# Patient Record
Sex: MALE | Race: BLACK OR AFRICAN AMERICAN | NOT HISPANIC OR LATINO | Employment: FULL TIME | URBAN - METROPOLITAN AREA
[De-identification: names, ages, dates, MRNs, and addresses within clinical notes are randomized per-mention and may not be internally consistent; named-entity substitution may affect disease eponyms.]

---

## 2024-03-04 ENCOUNTER — HOSPITAL ENCOUNTER (EMERGENCY)
Facility: HOSPITAL | Age: 44
Discharge: HOME | End: 2024-03-04
Payer: COMMERCIAL

## 2024-03-04 VITALS
BODY MASS INDEX: 30.78 KG/M2 | HEIGHT: 70 IN | DIASTOLIC BLOOD PRESSURE: 98 MMHG | HEART RATE: 84 BPM | TEMPERATURE: 98.1 F | SYSTOLIC BLOOD PRESSURE: 172 MMHG | OXYGEN SATURATION: 100 % | WEIGHT: 215 LBS | RESPIRATION RATE: 18 BRPM

## 2024-03-04 DIAGNOSIS — R36.9 PENILE DISCHARGE: ICD-10-CM

## 2024-03-04 DIAGNOSIS — Z11.3 ENCOUNTER FOR SCREENING EXAMINATION FOR SEXUALLY TRANSMITTED DISEASE: Primary | ICD-10-CM

## 2024-03-04 DIAGNOSIS — R03.0 ELEVATED BLOOD PRESSURE READING: ICD-10-CM

## 2024-03-04 PROCEDURE — 2500000004 HC RX 250 GENERAL PHARMACY W/ HCPCS (ALT 636 FOR OP/ED)

## 2024-03-04 PROCEDURE — 87661 TRICHOMONAS VAGINALIS AMPLIF: CPT | Mod: 59 | Performed by: NURSE PRACTITIONER

## 2024-03-04 PROCEDURE — 2500000001 HC RX 250 WO HCPCS SELF ADMINISTERED DRUGS (ALT 637 FOR MEDICARE OP)

## 2024-03-04 PROCEDURE — 99283 EMERGENCY DEPT VISIT LOW MDM: CPT

## 2024-03-04 PROCEDURE — 96372 THER/PROPH/DIAG INJ SC/IM: CPT

## 2024-03-04 PROCEDURE — 99284 EMERGENCY DEPT VISIT MOD MDM: CPT | Performed by: NURSE PRACTITIONER

## 2024-03-04 PROCEDURE — 87800 DETECT AGNT MULT DNA DIREC: CPT | Performed by: NURSE PRACTITIONER

## 2024-03-04 RX ORDER — CEFTRIAXONE 500 MG/1
500 INJECTION, POWDER, FOR SOLUTION INTRAMUSCULAR; INTRAVENOUS ONCE
Status: COMPLETED | OUTPATIENT
Start: 2024-03-04 | End: 2024-03-04

## 2024-03-04 RX ORDER — DOXYCYCLINE 100 MG/1
100 TABLET ORAL 2 TIMES DAILY
Qty: 14 TABLET | Refills: 0 | Status: SHIPPED | OUTPATIENT
Start: 2024-03-04 | End: 2024-03-11

## 2024-03-04 RX ORDER — CEFTRIAXONE 500 MG/1
INJECTION, POWDER, FOR SOLUTION INTRAMUSCULAR; INTRAVENOUS
Status: COMPLETED
Start: 2024-03-04 | End: 2024-03-04

## 2024-03-04 RX ORDER — METRONIDAZOLE 500 MG/1
2000 TABLET ORAL ONCE
Status: COMPLETED | OUTPATIENT
Start: 2024-03-04 | End: 2024-03-04

## 2024-03-04 RX ORDER — METRONIDAZOLE 500 MG/1
TABLET ORAL
Status: COMPLETED
Start: 2024-03-04 | End: 2024-03-04

## 2024-03-04 RX ADMIN — METRONIDAZOLE 2000 MG: 500 TABLET ORAL at 20:15

## 2024-03-04 RX ADMIN — CEFTRIAXONE 500 MG: 500 INJECTION, POWDER, FOR SOLUTION INTRAMUSCULAR; INTRAVENOUS at 20:16

## 2024-03-04 RX ADMIN — CEFTRIAXONE SODIUM 500 MG: 500 INJECTION, POWDER, FOR SOLUTION INTRAMUSCULAR; INTRAVENOUS at 20:16

## 2024-03-04 ASSESSMENT — LIFESTYLE VARIABLES
HAVE YOU EVER FELT YOU SHOULD CUT DOWN ON YOUR DRINKING: NO
EVER FELT BAD OR GUILTY ABOUT YOUR DRINKING: NO
HAVE PEOPLE ANNOYED YOU BY CRITICIZING YOUR DRINKING: NO
EVER HAD A DRINK FIRST THING IN THE MORNING TO STEADY YOUR NERVES TO GET RID OF A HANGOVER: NO

## 2024-03-04 ASSESSMENT — COLUMBIA-SUICIDE SEVERITY RATING SCALE - C-SSRS
6. HAVE YOU EVER DONE ANYTHING, STARTED TO DO ANYTHING, OR PREPARED TO DO ANYTHING TO END YOUR LIFE?: NO
2. HAVE YOU ACTUALLY HAD ANY THOUGHTS OF KILLING YOURSELF?: NO
1. IN THE PAST MONTH, HAVE YOU WISHED YOU WERE DEAD OR WISHED YOU COULD GO TO SLEEP AND NOT WAKE UP?: NO

## 2024-03-04 ASSESSMENT — PAIN - FUNCTIONAL ASSESSMENT: PAIN_FUNCTIONAL_ASSESSMENT: 0-10

## 2024-03-04 ASSESSMENT — PAIN SCALES - GENERAL: PAINLEVEL_OUTOF10: 0 - NO PAIN

## 2024-03-05 LAB
C TRACH RRNA SPEC QL NAA+PROBE: NEGATIVE
N GONORRHOEA DNA SPEC QL PROBE+SIG AMP: NEGATIVE
T VAGINALIS RRNA SPEC QL NAA+PROBE: POSITIVE

## 2024-03-05 NOTE — ED PROVIDER NOTES
Emergency Department Encounter  [facility] EMERGENCY MEDICINE    Patient: Ramiro Zavala  MRN: [id number]  : 1980  Date of Evaluation: 3/4/2024  ED Provider: BRIAN Shah      Chief Complaint       Chief Complaint   Patient presents with    Exposure to STD        Limitations to History: none  Historian: patient  Records reviewed: EMR inpatient and outpatient notes, Care Everywhere    This is a 43-year-old male who presents to the emergency room for a STD check. Patient is sexually active with females.  Patient states that today he developed white penile discharge. Denies any urinary symptoms, abdominal pain, nausea or vomiting.    PMH: Denies  PSH: Right arm surgery, Left ACL repair  Social HX: + smoker, denies alcohol or drug use.  Family HX: No family history pertinent to current presenting problem  Medications: Reviewed per EMR    ROS:     Review of Systems   Genitourinary:  Positive for penile discharge.     14 systems reviewed and otherwise acutely negative except as in the [place].        Past History     Past Medical History:   Diagnosis Date    Cutaneous abscess, unspecified 08/15/2019    Abscess     No past surgical history on file.      Medications/Allergies     Previous Medications    No medications on file     No Known Allergies     Physical Exam       ED Triage Vitals [24 1850]   Temperature Heart Rate Respirations BP   36.7 °C (98.1 °F) 84 18 (!) 172/98      Pulse Ox Temp src Heart Rate Source Patient Position   100 % -- -- --      BP Location FiO2 (%)     -- --       Physical Exam:    Appearance: Alert, oriented , cooperative,  in no acute distress. Well nourished & well hydrated.    Skin: Intact,  dry skin, no lesions, rash, petechiae or purpura.     Eyes: PERRLA, EOMs intact.    ENT: Hearing grossly intact. External auditory canals patent, tympanic membranes intact with visible landmarks.     Neck: Supple, without meningismus.     Pulmonary: Clear bilaterally  "with good chest wall excursion. No rales, rhonchi or wheezing. No accessory muscle use or stridor.    Cardiac: Normal S1, S2 without murmur, rub, gallop or extrasystole. No JVD, Carotids without bruits.    Abdomen: Soft, nontender, active bowel sounds.  No palpable organomegaly.  No rebound or guarding.      Musculoskeletal: Full range of motion. no pain, edema, or deformity. Pulses full and equal. No cyanosis, clubbing, or edema.    Neurological:  Normal sensation, no weakness, no focal findings identified.    Psychiatric: Appropriate mood and affect.       Diagnostics   Labs:  No results found for this or any previous visit (from the past 24 hour(s)).   Radiographs:  No orders to display             Assessment   In brief, Ramiro Zavala is a 43 y.o. male who presented to the emergency department with penile discharge.          ED Course/MDM   Visit Vitals  BP (!) 172/98   Pulse 84   Temp 36.7 °C (98.1 °F)   Resp 18   Ht 1.778 m (5' 10\")   Wt 97.5 kg (215 lb)   SpO2 100%   BMI 30.85 kg/m²   BSA 2.19 m²       Medications   cefTRIAXone (Rocephin) vial 500 mg (has no administration in time range)   metroNIDAZOLE (Flagyl) tablet 2,000 mg (has no administration in time range)       Patient remained stable while in the emergency department. Previous outpatient and ED records were reviewed. Outside records were reviewed.  Urine gonorrhea, chlamydia and trichomonas was obtained, pending results.  Patient declined a syphilis and HIV test.  Patient would like prophylactic treatment for STDs today.  Patient received ceftriaxone 500 mg IM once and Flagyl 2000 mg p.o. once.  Patient was advised to refrain from intercourse for at least 7 days and return the emergency room for worsening symptoms.  Patient received a prescription for doxycycline 100 mg twice a day for 7 days.    Final Impression    Encounter for screening examination for sexually transmitted disease  Penile discharge  Elevated blood pressure " reading    DISPOSITION  Disposition: Discharged home    Comment: Please note this report has been produced using speech recognition software and may contain errors related to that system including errors in grammar, punctuation, and spelling, as well as words and phrases that may be inappropriate.  If there are any questions or concerns please feel free to contact the dictating provider for clarification.    BRIAN Shah APRN-CNP  03/04/24 2010

## 2024-03-06 ENCOUNTER — TELEPHONE (OUTPATIENT)
Dept: PHARMACY | Facility: HOSPITAL | Age: 44
End: 2024-03-06
Payer: COMMERCIAL

## 2024-03-06 NOTE — PROGRESS NOTES
EDPD Note: Antibiotics Reviewed and Warranted    Contacted Mr./Mrs./Ms. Ramiro Zavala regarding a positive Trichomonas vaginalis result that was taken during their recent emergency room visit. I completed education with patient . The patient was treated appropriately with oral metronidazole 2000 mg once while in the ED. Patient reported no worsening symptoms, denied development of anything systemic. All other STD tests negative, so advise patient to discontinue antibiotic discharged with (doxycycline 100 mg PO BID x 7 days). Patient was also given 1 dose of ceftriaxone 500 mg IM while in the ED. Provided STD education. Recommended for patient to return to the ED or visit the nearest Urgent Care if symptoms worsen.      Contains abnormal data Trichomonas vaginalis, Nucleic Acid Detection: [id number]  Order: [id number]  Collected 3/4/2024 20:07       Status: Final result       Visible to patient: No (inaccessible in [facility])    1 Result Note       1 Follow-up Encounter      Component  Ref Range & Units    Trichomonas Vaginalis  Negative, Invalid, TRICH neg Positive Abnormal    Comment: Performance characteristics for Trichomonas Vaginalis testing on urine samples has been validated by [facility].  Testing on this sample type is not FDA-approved, but such approval is not necessary. This laboratory is certified by CLIA to perform high complexity testing.   Resulting Agency [facility]              Narrative  Performed by: [facility]  The APTIMA Trichomonas vaginalis assay is FDA-approved for  testing on female endocervical swabs, vaginal swabs, and  ThinPrep liquid pap samples. Performance characteristics  for Trichomonas vaginalis on specific non-FDA-approved  sample types (female and male urine and male urethral  swabs) have been validated by [facility]. This laboratory is certified by  CLIA to perform high complexity testing. Samples from all  other sites are not  validated for this method.      Specimen Collected: 03/04/24 20:07 Last Resulted: 03/05/24 14:22           No further follow up needed from EDPD Team.     Jocelin Hidalgo, PharmD

## 2024-04-26 ENCOUNTER — HOSPITAL ENCOUNTER (EMERGENCY)
Facility: HOSPITAL | Age: 44
Discharge: HOME | End: 2024-04-26
Attending: EMERGENCY MEDICINE
Payer: COMMERCIAL

## 2024-04-26 ENCOUNTER — HOSPITAL ENCOUNTER (EMERGENCY)
Facility: HOSPITAL | Age: 44
Discharge: HOME | End: 2024-04-26
Payer: COMMERCIAL

## 2024-04-26 VITALS
HEART RATE: 71 BPM | OXYGEN SATURATION: 98 % | HEIGHT: 70 IN | DIASTOLIC BLOOD PRESSURE: 88 MMHG | TEMPERATURE: 98.4 F | BODY MASS INDEX: 31.5 KG/M2 | WEIGHT: 220 LBS | SYSTOLIC BLOOD PRESSURE: 153 MMHG | RESPIRATION RATE: 17 BRPM

## 2024-04-26 VITALS
TEMPERATURE: 98.4 F | WEIGHT: 220 LBS | BODY MASS INDEX: 31.5 KG/M2 | HEIGHT: 70 IN | RESPIRATION RATE: 18 BRPM | DIASTOLIC BLOOD PRESSURE: 99 MMHG | HEART RATE: 90 BPM | OXYGEN SATURATION: 95 % | SYSTOLIC BLOOD PRESSURE: 176 MMHG

## 2024-04-26 DIAGNOSIS — T85.692A: Primary | ICD-10-CM

## 2024-04-26 PROCEDURE — 99283 EMERGENCY DEPT VISIT LOW MDM: CPT | Performed by: PHYSICIAN ASSISTANT

## 2024-04-26 PROCEDURE — 2500000001 HC RX 250 WO HCPCS SELF ADMINISTERED DRUGS (ALT 637 FOR MEDICARE OP): Mod: SE | Performed by: PHYSICIAN ASSISTANT

## 2024-04-26 PROCEDURE — 4500999001 HC ED NO CHARGE

## 2024-04-26 PROCEDURE — 99283 EMERGENCY DEPT VISIT LOW MDM: CPT

## 2024-04-26 RX ORDER — CLINDAMYCIN HYDROCHLORIDE 150 MG/1
450 CAPSULE ORAL 3 TIMES DAILY
Qty: 63 CAPSULE | Refills: 0 | Status: SHIPPED | OUTPATIENT
Start: 2024-04-26 | End: 2024-05-03

## 2024-04-26 RX ORDER — BACITRACIN ZINC 500 UNIT/G
OINTMENT IN PACKET (EA) TOPICAL ONCE
Qty: 2 EACH | Refills: 0 | Status: COMPLETED | OUTPATIENT
Start: 2024-04-26 | End: 2024-04-26

## 2024-04-26 RX ADMIN — CLINDAMYCIN HYDROCHLORIDE 450 MG: 300 CAPSULE ORAL at 18:37

## 2024-04-26 RX ADMIN — BACITRACIN 1 APPLICATION: 500 OINTMENT TOPICAL at 18:37

## 2024-04-26 ASSESSMENT — PAIN - FUNCTIONAL ASSESSMENT: PAIN_FUNCTIONAL_ASSESSMENT: 0-10

## 2024-04-26 ASSESSMENT — COLUMBIA-SUICIDE SEVERITY RATING SCALE - C-SSRS

## 2024-04-26 ASSESSMENT — PAIN SCALES - GENERAL: PAINLEVEL_OUTOF10: 0 - NO PAIN

## 2024-04-26 NOTE — DISCHARGE INSTRUCTIONS
If you experience any warmth, redness, drainage, pus, recommend return to [facility] ER to be seen by orthopedics.    Make sure you schedule an appointment with orthopedics to be seen this upcoming week in the office.    Do not pick at this or pull at it, keep it covered so it does not become infected.  Change the dressing once daily.  Clean the area with soap and water, pat dry and reapply dressing.

## 2024-04-26 NOTE — ED TRIAGE NOTES
Pt with recent ACL sx in August, Pt states he thinks he still has sutures in it thathe couldn't remove himself

## 2024-04-26 NOTE — ED PROVIDER NOTES
HPI   Chief Complaint   Patient presents with    Suture / Staple Removal       Patient is a 43-year-old male who presents today for evaluation of suture removal, patient states a week ago he noticed a scab over his ankle where he had an Achilles tendon repair a year and a half ago, August 2022.  He states that it was healed, he was not needing to follow-up with Ortho, he was having complete improvement in his symptoms.  He states the scab seem to have come out of nowhere and when he started picking at it there was a piece of suture that came out.  He states that it was a loop and he pulled on it and tried to pull it out himself but it is not coming out so he is here for suture removal.                          Braden Coma Scale Score: 15                     Patient History   Past Medical History:   Diagnosis Date    Cutaneous abscess, unspecified 08/15/2019    Abscess     No past surgical history on file.  No family history on file.  Social History     Tobacco Use    Smoking status: Not on file    Smokeless tobacco: Not on file   Substance Use Topics    Alcohol use: Not on file    Drug use: Not on file       Physical Exam   ED Triage Vitals [04/26/24 1700]   Temperature Heart Rate Respirations BP   36.9 °C (98.4 °F) 71 17 (!) 180/99      Pulse Ox Temp Source Heart Rate Source Patient Position   98 % Temporal Monitor Sitting      BP Location FiO2 (%)     Right arm --       Physical Exam  Vitals and nursing note reviewed.   Constitutional:       General: He is not in acute distress.     Appearance: Normal appearance. He is not toxic-appearing.   HENT:      Head: Normocephalic and atraumatic.      Nose: Nose normal.   Eyes:      Extraocular Movements: Extraocular movements intact.   Cardiovascular:      Rate and Rhythm: Normal rate and regular rhythm.   Pulmonary:      Effort: Pulmonary effort is normal.   Abdominal:      Palpations: Abdomen is soft.   Musculoskeletal:         General: Normal range of motion.       Cervical back: Normal range of motion and neck supple.   Skin:     General: Skin is warm and dry.      Comments: Scar present to posterior left ankle over Achilles, suture that is white and blue consistent with FiberWire suture present.  No surrounding erythema drainage or signs of infection.     Neurological:      General: No focal deficit present.      Mental Status: He is alert.   Psychiatric:         Mood and Affect: Mood normal.         Thought Content: Thought content normal.         ED Course & MDM   Diagnoses as of 04/26/24 1842   Protruding suture present on examination       Medical Decision Making    MDM: Patient is a 43-year-old male who presents today for evaluation of an internal suture that came through the skin about a week ago. Patient states he tugged on a little bit and so it is now approximately 1-1/2 inches long.    Per review of the op note, this appears to be a piece of FiberWire that was used internally to suture the Achilles together and is an internal suture that is not amenable to be removed in the ED today. There is no warmth erythema or swelling but patient did tell the ED attending that there was a little bit of pus at some point.  For this reason we will cover patient with clindamycin.  At this time there is no emergent indication for orthopedic evaluation however patient does need to follow-up with the original orthopedic surgeon at [facility] who did the procedure, Dr. Cosme.  We did notify patient if there is any warmth redness swelling drainage or signs of infection despite the antibiotic that he needs to go to [facility] ER so they may consult orthopedics and he expressed understanding.  He is aware that he needs to make an appointment for this week with their office as an outpatient.  Area was cleansed with alcohol, bacitracin was applied as well as a nonstick dressing and Kerlix roll.  Patient educated on how to keep this clean, signs and symptoms to watch out for as well as  wound care.  Will be discharged in stable condition.  Patient was seen in conjunction with Dr. Khan, attending.        Procedure  Procedures     Christi Lake PA-C  04/26/24 3247

## 2024-05-29 ENCOUNTER — APPOINTMENT (OUTPATIENT)
Dept: RADIOLOGY | Facility: HOSPITAL | Age: 44
End: 2024-05-29
Payer: COMMERCIAL

## 2024-05-29 ENCOUNTER — HOSPITAL ENCOUNTER (EMERGENCY)
Facility: HOSPITAL | Age: 44
Discharge: HOME | End: 2024-05-30
Attending: EMERGENCY MEDICINE
Payer: COMMERCIAL

## 2024-05-29 DIAGNOSIS — T82.898A OCCLUSION OF PERIPHERALLY INSERTED CENTRAL CATHETER (PICC) LINE, INITIAL ENCOUNTER (CMS-HCC): Primary | ICD-10-CM

## 2024-05-29 PROCEDURE — 99284 EMERGENCY DEPT VISIT MOD MDM: CPT | Performed by: EMERGENCY MEDICINE

## 2024-05-29 PROCEDURE — 71045 X-RAY EXAM CHEST 1 VIEW: CPT | Mod: FOREIGN READ | Performed by: RADIOLOGY

## 2024-05-29 PROCEDURE — 71045 X-RAY EXAM CHEST 1 VIEW: CPT

## 2024-05-29 PROCEDURE — 99283 EMERGENCY DEPT VISIT LOW MDM: CPT | Mod: 25

## 2024-05-29 ASSESSMENT — COLUMBIA-SUICIDE SEVERITY RATING SCALE - C-SSRS
1. IN THE PAST MONTH, HAVE YOU WISHED YOU WERE DEAD OR WISHED YOU COULD GO TO SLEEP AND NOT WAKE UP?: NO
2. HAVE YOU ACTUALLY HAD ANY THOUGHTS OF KILLING YOURSELF?: NO
6. HAVE YOU EVER DONE ANYTHING, STARTED TO DO ANYTHING, OR PREPARED TO DO ANYTHING TO END YOUR LIFE?: NO

## 2024-05-30 VITALS
SYSTOLIC BLOOD PRESSURE: 176 MMHG | RESPIRATION RATE: 18 BRPM | OXYGEN SATURATION: 97 % | TEMPERATURE: 98.1 F | HEART RATE: 76 BPM | DIASTOLIC BLOOD PRESSURE: 95 MMHG

## 2024-05-30 PROCEDURE — 2500000004 HC RX 250 GENERAL PHARMACY W/ HCPCS (ALT 636 FOR OP/ED): Mod: JW,SE | Performed by: EMERGENCY MEDICINE

## 2024-05-30 RX ADMIN — ALTEPLASE 1 MG: 2.2 INJECTION, POWDER, LYOPHILIZED, FOR SOLUTION INTRAVENOUS at 01:39

## 2024-05-30 ASSESSMENT — LIFESTYLE VARIABLES
EVER HAD A DRINK FIRST THING IN THE MORNING TO STEADY YOUR NERVES TO GET RID OF A HANGOVER: NO
TOTAL SCORE: 0
HAVE YOU EVER FELT YOU SHOULD CUT DOWN ON YOUR DRINKING: NO
HAVE PEOPLE ANNOYED YOU BY CRITICIZING YOUR DRINKING: NO
EVER FELT BAD OR GUILTY ABOUT YOUR DRINKING: NO

## 2024-05-30 NOTE — ED PROVIDER NOTES
HPI   Chief Complaint   Patient presents with    PICC clogged       HPI  44-year-old male with history of prior left ankle surgery s/p wound dehiscence and status post repair on long-term antibiotics via PICC line who presents for a clogged PICC line.  Patient reports his home health aide presented earlier today to draw a routine blood work however the line would not drop.  Denies any fevers or chills.  Denies any swelling or pain around the left upper extremity PICC line site.                  No data recorded                   Patient History   Past Medical History:   Diagnosis Date    Cutaneous abscess, unspecified 08/15/2019    Abscess     History reviewed. No pertinent surgical history.  No family history on file.  Social History     Tobacco Use    Smoking status: Not on file    Smokeless tobacco: Not on file   Substance Use Topics    Alcohol use: Not on file    Drug use: Not on file       Physical Exam   ED Triage Vitals   Temperature Heart Rate Respirations BP   05/29/24 1714 05/29/24 1714 05/29/24 1714 05/29/24 1714   36.5 °C (97.7 °F) 82 16 (!) 204/117      Pulse Ox Temp src Heart Rate Source Patient Position   05/29/24 1714 -- 05/29/24 2208 05/29/24 2208   97 %  Monitor Sitting      BP Location FiO2 (%)     05/29/24 2208 --     Right arm        Physical Exam  Vitals and nursing note reviewed.   Constitutional:       Appearance: Normal appearance.   HENT:      Head: Normocephalic.      Mouth/Throat:      Mouth: Mucous membranes are moist.   Eyes:      Conjunctiva/sclera: Conjunctivae normal.   Cardiovascular:      Rate and Rhythm: Normal rate.   Pulmonary:      Effort: Pulmonary effort is normal.   Abdominal:      General: Abdomen is flat.   Musculoskeletal:      Comments: Left upper extremity PICC line with no tenderness, no overlying erythema, no warmth to palpation   Neurological:      Mental Status: He is alert and oriented to person, place, and time.   Psychiatric:         Mood and Affect: Mood normal.        ED Course & MDM   Diagnoses as of 05/30/24 0542   Occlusion of peripherally inserted central catheter (PICC) line, initial encounter ([facility])       Medical Decision Making  44-year-old male with history of prior left ankle surgery s/p wound dehiscence and status post repair on long-term antibiotics via PICC line who presents for a clogged PICC line.  Patient had home health aide present today to collect routine blood work and was unable to access the line for which he was instructed to present to the ED.  On exam, patient is hypertensive, otherwise vitals normal, he is in no acute distress and nontoxic-appearing, the left upper extremity PICC line has no overlying erythema or rash, no warmth to palpation, no tenderness.  Cathflo was ordered to do well within the line to potentially break up any clot.    Patient was offered dose of vancomycin and ceftriaxone that he had missed earlier today however declined as he states he has these medications at home.    Following Cathflo administration, PICC line subsequently drawing and flushing normally.  Patient discharged home in stable condition with instructions to take his daily vancomycin and ceftriaxone once home.    Procedure  Procedures     Yang Orantes DO  Resident  05/30/24 0542

## 2025-03-13 ENCOUNTER — HOSPITAL ENCOUNTER (EMERGENCY)
Facility: HOSPITAL | Age: 45
Discharge: HOME | End: 2025-03-13

## 2025-03-13 VITALS
SYSTOLIC BLOOD PRESSURE: 183 MMHG | HEART RATE: 88 BPM | DIASTOLIC BLOOD PRESSURE: 110 MMHG | RESPIRATION RATE: 17 BRPM | OXYGEN SATURATION: 99 % | TEMPERATURE: 97.7 F

## 2025-03-13 DIAGNOSIS — Z20.2 HPV EXPOSURE: ICD-10-CM

## 2025-03-13 DIAGNOSIS — Z11.3 ENCOUNTER FOR SCREENING EXAMINATION FOR SEXUALLY TRANSMITTED DISEASE: Primary | ICD-10-CM

## 2025-03-13 DIAGNOSIS — R03.0 ELEVATED BLOOD PRESSURE READING: ICD-10-CM

## 2025-03-13 PROCEDURE — 87491 CHLMYD TRACH DNA AMP PROBE: CPT | Performed by: NURSE PRACTITIONER

## 2025-03-13 PROCEDURE — 87661 TRICHOMONAS VAGINALIS AMPLIF: CPT | Performed by: NURSE PRACTITIONER

## 2025-03-13 PROCEDURE — 99284 EMERGENCY DEPT VISIT MOD MDM: CPT | Performed by: NURSE PRACTITIONER

## 2025-03-13 PROCEDURE — 99283 EMERGENCY DEPT VISIT LOW MDM: CPT

## 2025-03-13 PROCEDURE — 99281 EMR DPT VST MAYX REQ PHY/QHP: CPT

## 2025-03-13 ASSESSMENT — PAIN - FUNCTIONAL ASSESSMENT: PAIN_FUNCTIONAL_ASSESSMENT: 0-10

## 2025-03-13 ASSESSMENT — PAIN SCALES - GENERAL: PAINLEVEL_OUTOF10: 0 - NO PAIN

## 2025-03-13 NOTE — ED PROVIDER NOTES
Emergency Department Encounter  [facility] EMERGENCY MEDICINE    Patient: Ramiro Zavala  MRN: [id number]  : 1980  Date of Evaluation: 3/13/2025  ED Provider: BRIAN Shah      Chief Complaint       Chief Complaint   Patient presents with    Exposure to STD        Limitations to History: none  Historian: patient  Records reviewed: EMR inpatient and outpatient notes, Care Everywhere    This is a 44-year-old male with no significant PMH who presents to the emergency room stating he was exposed to HPV.  Patient states that his significant other tested positive for HPV on a Pap test.  Patient denies any genital lesions, rashes or bumps.  Denies any penile discharge or urinary symptoms.  Denies any headaches, visual changes, chest pain or shortness of breath.  Patient states that he has been told several times that his blood pressure has been elevated and has never been on medications for blood pressure.    PMH: Denies  PSH: Achilles surgery, right arm surgery  Allergies: NKDA  Social HX: + Smoker, denies alcohol or drug use.  Family HX: No family history pertinent to current presenting problem  Medications: Reviewed per EMR    ROS:     Review of Systems  14 systems reviewed and otherwise acutely negative except as in the [place].        Past History     Past Medical History:   Diagnosis Date    Cutaneous abscess, unspecified 08/15/2019    Abscess     History reviewed. No pertinent surgical history.      Medications/Allergies     Previous Medications    No medications on file     No Known Allergies     Physical Exam       ED Triage Vitals [25 1735]   Temperature Heart Rate Respirations BP   36.5 °C (97.7 °F) 84 16 (!) 194/116      Pulse Ox Temp Source Heart Rate Source Patient Position   98 % Temporal -- --      BP Location FiO2 (%)     -- --       Physical Exam:    Appearance: Alert, oriented , cooperative,  in no acute distress.     Skin: Intact,  dry skin, no lesions, rash,  petechiae or purpura.     Eyes: PERRLA, EOMs intact.    ENT: Hearing grossly intact. External auditory canals patent, tympanic membranes intact with visible landmarks. Nares patent, mucus membranes moist. Dentition without lesions. Pharynx clear, uvula midline.     Neck: Supple, without meningismus.     Pulmonary: Clear bilaterally with good chest wall excursion. No rales, rhonchi or wheezing. No accessory muscle use or stridor.    Cardiac: Normal S1, S2 without murmur, rub, gallop or extrasystole. No JVD, Carotids without bruits.    Abdomen: Soft, nontender, active bowel sounds.  No palpable organomegaly.  No rebound or guarding.  No CVA tenderness.    Musculoskeletal: Full range of motion. no pain, edema, or deformity. Pulses full and equal. No cyanosis, clubbing, or edema.    Neurological:  Normal sensation, no weakness, no focal findings identified.    Psychiatric: Appropriate mood and affect.       Diagnostics   Labs:  No results found for this or any previous visit (from the past 24 hours).   Radiographs:  No orders to display             Assessment   In brief, Ramiro Zavala is a 44 y.o. male who presented to the emergency department for exposure to HPV.          ED Course/MDM     Diagnoses as of 03/13/25 1752   Encounter for screening examination for sexually transmitted disease   HPV exposure   Elevated blood pressure reading      Visit Vitals  BP (!) 194/116   Pulse 84   Temp 36.5 °C (97.7 °F) (Temporal)   Resp 16   SpO2 98%       Medications - No data to display    Patient remained stable while in the emergency department. Previous outpatient and ED records were reviewed. Outside records were reviewed.  Urine gonorrhea, chlamydia and trichomonas was obtained, pending results.  Patient declined an HIV and syphilis test while in the emergency room.  Patient denies any genital lesions, rash or bumps on exam.  Discussed HPV with the patient and no further testing is indicated at this time since he does not  have symptoms. Patient has an elevated blood pressure reading while in the emergency department.  Denies any headaches, visual changes, chest pain or shortness of breath.  Advised the patient to follow-up with a primary care doctor regarding the elevated blood pressure reading.  Patient was advised to return the emergency room with worsening symptoms.    Final Impression      1. Encounter for screening examination for sexually transmitted disease    2. HPV exposure    3. Elevated blood pressure reading          DISPOSITION  Disposition: Discharged home    Comment: Please note this report has been produced using speech recognition software and may contain errors related to that system including errors in grammar, punctuation, and spelling, as well as words and phrases that may be inappropriate.  If there are any questions or concerns please feel free to contact the dictating provider for clarification.    SMITA Shah-BRIAN Miller  03/13/25 8433

## 2025-03-14 LAB
C TRACH RRNA SPEC QL NAA+PROBE: NEGATIVE
N GONORRHOEA DNA SPEC QL PROBE+SIG AMP: NEGATIVE
T VAGINALIS RRNA SPEC QL NAA+PROBE: NEGATIVE